# Patient Record
Sex: MALE | Race: WHITE | NOT HISPANIC OR LATINO | Employment: FULL TIME | URBAN - NONMETROPOLITAN AREA
[De-identification: names, ages, dates, MRNs, and addresses within clinical notes are randomized per-mention and may not be internally consistent; named-entity substitution may affect disease eponyms.]

---

## 2017-05-17 ENCOUNTER — HOSPITAL ENCOUNTER (EMERGENCY)
Facility: HOSPITAL | Age: 22
Discharge: HOME | End: 2017-05-17
Attending: EMERGENCY MEDICINE | Admitting: EMERGENCY MEDICINE

## 2017-05-17 VITALS
OXYGEN SATURATION: 98 % | SYSTOLIC BLOOD PRESSURE: 105 MMHG | DIASTOLIC BLOOD PRESSURE: 66 MMHG | WEIGHT: 123 LBS | HEART RATE: 74 BPM | RESPIRATION RATE: 18 BRPM | TEMPERATURE: 98.4 F | BODY MASS INDEX: 15.78 KG/M2 | HEIGHT: 74 IN

## 2017-05-17 DIAGNOSIS — F32.A DEPRESSION, UNSPECIFIED DEPRESSION TYPE: Primary | ICD-10-CM

## 2017-05-17 LAB
ALBUMIN SERPL-MCNC: 4.9 G/DL (ref 3.4–4.8)
ALBUMIN/GLOB SERPL: 1.6 G/DL (ref 1.1–1.8)
ALP SERPL-CCNC: 60 U/L (ref 38–126)
ALT SERPL W P-5'-P-CCNC: 30 U/L (ref 21–72)
AMPHET+METHAMPHET UR QL: NEGATIVE
ANION GAP SERPL CALCULATED.3IONS-SCNC: 12 MMOL/L (ref 5–15)
APAP SERPL-MCNC: <10 MCG/ML (ref 10–30)
AST SERPL-CCNC: 26 U/L (ref 17–59)
BARBITURATES UR QL SCN: NEGATIVE
BASOPHILS # BLD AUTO: 0.01 10*3/MM3 (ref 0–0.2)
BASOPHILS NFR BLD AUTO: 0.1 % (ref 0–2)
BENZODIAZ UR QL SCN: NEGATIVE
BILIRUB SERPL-MCNC: 0.7 MG/DL (ref 0.2–1.3)
BUN BLD-MCNC: 13 MG/DL (ref 7–21)
BUN/CREAT SERPL: 13 (ref 7–25)
CALCIUM SPEC-SCNC: 10.4 MG/DL (ref 8.4–10.2)
CANNABINOIDS SERPL QL: POSITIVE
CHLORIDE SERPL-SCNC: 103 MMOL/L (ref 95–110)
CO2 SERPL-SCNC: 27 MMOL/L (ref 22–31)
COCAINE UR QL: NEGATIVE
CREAT BLD-MCNC: 1 MG/DL (ref 0.7–1.3)
DEPRECATED RDW RBC AUTO: 40.3 FL (ref 35.1–43.9)
EOSINOPHIL # BLD AUTO: 0.1 10*3/MM3 (ref 0–0.7)
EOSINOPHIL NFR BLD AUTO: 1.1 % (ref 0–7)
ERYTHROCYTE [DISTWIDTH] IN BLOOD BY AUTOMATED COUNT: 12.1 % (ref 11.5–14.5)
ETHANOL BLD-MCNC: <10 MG/DL (ref 0–10)
ETHANOL UR QL: <0.01 %
GFR SERPL CREATININE-BSD FRML MDRD: 93 ML/MIN/1.73 (ref 77–179)
GLOBULIN UR ELPH-MCNC: 3.1 GM/DL (ref 2.3–3.5)
GLUCOSE BLD-MCNC: 77 MG/DL (ref 60–100)
GLUCOSE BLDC GLUCOMTR-MCNC: 86 MG/DL (ref 70–130)
HCT VFR BLD AUTO: 42.7 % (ref 39–49)
HGB BLD-MCNC: 15.4 G/DL (ref 13.7–17.3)
HOLD SPECIMEN: NORMAL
IMM GRANULOCYTES # BLD: 0.01 10*3/MM3 (ref 0–0.02)
IMM GRANULOCYTES NFR BLD: 0.1 % (ref 0–0.5)
LYMPHOCYTES # BLD AUTO: 2.54 10*3/MM3 (ref 0.6–4.2)
LYMPHOCYTES NFR BLD AUTO: 28 % (ref 10–50)
MCH RBC QN AUTO: 32.6 PG (ref 26.5–34)
MCHC RBC AUTO-ENTMCNC: 36.1 G/DL (ref 31.5–36.3)
MCV RBC AUTO: 90.5 FL (ref 80–98)
METHADONE UR QL SCN: NEGATIVE
MONOCYTES # BLD AUTO: 0.76 10*3/MM3 (ref 0–0.9)
MONOCYTES NFR BLD AUTO: 8.4 % (ref 0–12)
NEUTROPHILS # BLD AUTO: 5.66 10*3/MM3 (ref 2–8.6)
NEUTROPHILS NFR BLD AUTO: 62.3 % (ref 37–80)
OPIATES UR QL: NEGATIVE
OXYCODONE UR QL SCN: NEGATIVE
PLATELET # BLD AUTO: 138 10*3/MM3 (ref 150–450)
PMV BLD AUTO: 10.8 FL (ref 8–12)
POTASSIUM BLD-SCNC: 3.7 MMOL/L (ref 3.5–5.1)
PROT SERPL-MCNC: 8 G/DL (ref 6.3–8.6)
RBC # BLD AUTO: 4.72 10*6/MM3 (ref 4.37–5.74)
SALICYLATES SERPL-MCNC: <1 MG/DL (ref 10–20)
SODIUM BLD-SCNC: 142 MMOL/L (ref 137–145)
WBC NRBC COR # BLD: 9.08 10*3/MM3 (ref 3.2–9.8)
WHOLE BLOOD HOLD SPECIMEN: NORMAL
WHOLE BLOOD HOLD SPECIMEN: NORMAL

## 2017-05-17 PROCEDURE — 80307 DRUG TEST PRSMV CHEM ANLYZR: CPT | Performed by: EMERGENCY MEDICINE

## 2017-05-17 PROCEDURE — 85025 COMPLETE CBC W/AUTO DIFF WBC: CPT | Performed by: EMERGENCY MEDICINE

## 2017-05-17 PROCEDURE — 82962 GLUCOSE BLOOD TEST: CPT

## 2017-05-17 PROCEDURE — 80053 COMPREHEN METABOLIC PANEL: CPT | Performed by: EMERGENCY MEDICINE

## 2017-05-17 PROCEDURE — 99284 EMERGENCY DEPT VISIT MOD MDM: CPT

## 2017-05-17 RX ORDER — SODIUM CHLORIDE 0.9 % (FLUSH) 0.9 %
10 SYRINGE (ML) INJECTION AS NEEDED
Status: DISCONTINUED | OUTPATIENT
Start: 2017-05-17 | End: 2017-05-17 | Stop reason: HOSPADM

## 2017-05-17 RX ADMIN — Medication 10 ML: at 11:08

## 2020-12-03 PROCEDURE — U0003 INFECTIOUS AGENT DETECTION BY NUCLEIC ACID (DNA OR RNA); SEVERE ACUTE RESPIRATORY SYNDROME CORONAVIRUS 2 (SARS-COV-2) (CORONAVIRUS DISEASE [COVID-19]), AMPLIFIED PROBE TECHNIQUE, MAKING USE OF HIGH THROUGHPUT TECHNOLOGIES AS DESCRIBED BY CMS-2020-01-R: HCPCS | Performed by: NURSE PRACTITIONER

## 2021-06-10 PROBLEM — R31.9 HEMATURIA: Status: ACTIVE | Noted: 2021-06-10

## 2021-06-10 PROBLEM — R10.9 ABDOMINAL PAIN: Status: ACTIVE | Noted: 2021-06-10

## 2021-06-14 ENCOUNTER — HOSPITAL ENCOUNTER (EMERGENCY)
Facility: HOSPITAL | Age: 26
Discharge: HOME | End: 2021-06-14
Attending: FAMILY MEDICINE | Admitting: FAMILY MEDICINE

## 2021-06-14 ENCOUNTER — APPOINTMENT (OUTPATIENT)
Dept: CT IMAGING | Facility: HOSPITAL | Age: 26
End: 2021-06-14

## 2021-06-14 VITALS
HEIGHT: 74 IN | WEIGHT: 126 LBS | TEMPERATURE: 97.3 F | BODY MASS INDEX: 16.17 KG/M2 | SYSTOLIC BLOOD PRESSURE: 123 MMHG | DIASTOLIC BLOOD PRESSURE: 66 MMHG | RESPIRATION RATE: 16 BRPM | HEART RATE: 60 BPM | OXYGEN SATURATION: 97 %

## 2021-06-14 DIAGNOSIS — N23 RENAL COLIC ON LEFT SIDE: ICD-10-CM

## 2021-06-14 DIAGNOSIS — N20.0 KIDNEY STONES: Primary | ICD-10-CM

## 2021-06-14 LAB
ALBUMIN SERPL-MCNC: 4.3 G/DL (ref 3.5–5.2)
ALBUMIN/GLOB SERPL: 2.2 G/DL
ALP SERPL-CCNC: 67 U/L (ref 39–117)
ALT SERPL W P-5'-P-CCNC: 7 U/L (ref 1–41)
ANION GAP SERPL CALCULATED.3IONS-SCNC: 5 MMOL/L (ref 5–15)
ANISOCYTOSIS BLD QL: ABNORMAL
AST SERPL-CCNC: 14 U/L (ref 1–40)
BACTERIA UR QL AUTO: ABNORMAL /HPF
BILIRUB SERPL-MCNC: 0.2 MG/DL (ref 0–1.2)
BILIRUB UR QL STRIP: NEGATIVE
BUN SERPL-MCNC: 11 MG/DL (ref 6–20)
BUN/CREAT SERPL: 10 (ref 7–25)
CALCIUM SPEC-SCNC: 9.8 MG/DL (ref 8.6–10.5)
CHLORIDE SERPL-SCNC: 102 MMOL/L (ref 98–107)
CLARITY UR: CLEAR
CO2 SERPL-SCNC: 31 MMOL/L (ref 22–29)
COLOR UR: YELLOW
CREAT SERPL-MCNC: 1.1 MG/DL (ref 0.76–1.27)
DEPRECATED RDW RBC AUTO: 41 FL (ref 37–54)
EOSINOPHIL # BLD MANUAL: 0.36 10*3/MM3 (ref 0–0.4)
EOSINOPHIL NFR BLD MANUAL: 5 % (ref 0.3–6.2)
ERYTHROCYTE [DISTWIDTH] IN BLOOD BY AUTOMATED COUNT: 11.9 % (ref 12.3–15.4)
GFR SERPL CREATININE-BSD FRML MDRD: 81 ML/MIN/1.73
GLOBULIN UR ELPH-MCNC: 2 GM/DL
GLUCOSE SERPL-MCNC: 93 MG/DL (ref 65–99)
GLUCOSE UR STRIP-MCNC: NEGATIVE MG/DL
HCT VFR BLD AUTO: 40.1 % (ref 37.5–51)
HGB BLD-MCNC: 13.9 G/DL (ref 13–17.7)
HGB UR QL STRIP.AUTO: ABNORMAL
HOLD SPECIMEN: NORMAL
HOLD SPECIMEN: NORMAL
HYALINE CASTS UR QL AUTO: ABNORMAL /LPF
KETONES UR QL STRIP: NEGATIVE
LEUKOCYTE ESTERASE UR QL STRIP.AUTO: ABNORMAL
LIPASE SERPL-CCNC: 26 U/L (ref 13–60)
LYMPHOCYTES # BLD MANUAL: 3.92 10*3/MM3 (ref 0.7–3.1)
LYMPHOCYTES NFR BLD MANUAL: 55 % (ref 19.6–45.3)
LYMPHOCYTES NFR BLD MANUAL: 8 % (ref 5–12)
MCH RBC QN AUTO: 32.4 PG (ref 26.6–33)
MCHC RBC AUTO-ENTMCNC: 34.7 G/DL (ref 31.5–35.7)
MCV RBC AUTO: 93.5 FL (ref 79–97)
MONOCYTES # BLD AUTO: 0.57 10*3/MM3 (ref 0.1–0.9)
NEUTROPHILS # BLD AUTO: 2.28 10*3/MM3 (ref 1.7–7)
NEUTROPHILS NFR BLD MANUAL: 30 % (ref 42.7–76)
NEUTS BAND NFR BLD MANUAL: 2 % (ref 0–5)
NITRITE UR QL STRIP: NEGATIVE
PH UR STRIP.AUTO: 6.5 [PH] (ref 5–9)
PLATELET # BLD AUTO: 144 10*3/MM3 (ref 140–450)
PMV BLD AUTO: 10.5 FL (ref 6–12)
POTASSIUM SERPL-SCNC: 3.8 MMOL/L (ref 3.5–5.2)
PROT SERPL-MCNC: 6.3 G/DL (ref 6–8.5)
PROT UR QL STRIP: NEGATIVE
RBC # BLD AUTO: 4.29 10*6/MM3 (ref 4.14–5.8)
RBC # UR: ABNORMAL /HPF
REF LAB TEST METHOD: ABNORMAL
SMALL PLATELETS BLD QL SMEAR: ADEQUATE
SODIUM SERPL-SCNC: 138 MMOL/L (ref 136–145)
SP GR UR STRIP: 1.02 (ref 1–1.03)
SQUAMOUS #/AREA URNS HPF: ABNORMAL /HPF
UROBILINOGEN UR QL STRIP: ABNORMAL
WBC # BLD AUTO: 7.12 10*3/MM3 (ref 3.4–10.8)
WBC MORPH BLD: NORMAL
WBC UR QL AUTO: ABNORMAL /HPF
WHOLE BLOOD HOLD SPECIMEN: NORMAL

## 2021-06-14 PROCEDURE — 81001 URINALYSIS AUTO W/SCOPE: CPT | Performed by: FAMILY MEDICINE

## 2021-06-14 PROCEDURE — 83690 ASSAY OF LIPASE: CPT | Performed by: FAMILY MEDICINE

## 2021-06-14 PROCEDURE — 85007 BL SMEAR W/DIFF WBC COUNT: CPT | Performed by: FAMILY MEDICINE

## 2021-06-14 PROCEDURE — 99283 EMERGENCY DEPT VISIT LOW MDM: CPT

## 2021-06-14 PROCEDURE — 85025 COMPLETE CBC W/AUTO DIFF WBC: CPT | Performed by: FAMILY MEDICINE

## 2021-06-14 PROCEDURE — 74176 CT ABD & PELVIS W/O CONTRAST: CPT

## 2021-06-14 PROCEDURE — 80053 COMPREHEN METABOLIC PANEL: CPT | Performed by: FAMILY MEDICINE

## 2021-06-14 RX ORDER — HYDROCODONE BITARTRATE AND ACETAMINOPHEN 5; 325 MG/1; MG/1
1 TABLET ORAL EVERY 6 HOURS PRN
Qty: 12 TABLET | Refills: 0 | Status: SHIPPED | OUTPATIENT
Start: 2021-06-14 | End: 2022-01-10

## 2021-06-14 RX ORDER — TAMSULOSIN HYDROCHLORIDE 0.4 MG/1
1 CAPSULE ORAL DAILY
Qty: 7 CAPSULE | Refills: 0 | Status: SHIPPED | OUTPATIENT
Start: 2021-06-14 | End: 2022-01-10

## 2021-06-14 RX ORDER — ONDANSETRON 4 MG/1
4 TABLET, ORALLY DISINTEGRATING ORAL EVERY 6 HOURS PRN
Qty: 10 TABLET | Refills: 0 | Status: SHIPPED | OUTPATIENT
Start: 2021-06-14 | End: 2022-01-10

## 2021-06-14 RX ORDER — SODIUM CHLORIDE 0.9 % (FLUSH) 0.9 %
10 SYRINGE (ML) INJECTION AS NEEDED
Status: DISCONTINUED | OUTPATIENT
Start: 2021-06-14 | End: 2021-06-14 | Stop reason: HOSPADM

## 2021-06-14 NOTE — ED TRIAGE NOTES
PT COMES IN TODAY C/O A KIDNEY STONE AND L FLANK PAIN FOR ABOUT 1.5 WEEKS. HE WAS SEEN AT .     HE IS ALERT AND ORIENTED X4. STATES THAT HE IS STILL URINATING.

## 2021-06-21 NOTE — ED PROVIDER NOTES
Subjective     Flank Pain  Pain location:  L flank  Pain quality: aching    Pain radiates to:  LLQ  Pain severity:  Moderate  Duration:  10 days  Progression:  Waxing and waning  Relieved by:  Nothing  Worsened by:  Nothing  Associated symptoms: no chest pain, no chills, no cough, no diarrhea, no dysuria, no fatigue, no fever, no nausea, no shortness of breath, no sore throat and no vomiting        Review of Systems   Constitutional: Negative for appetite change, chills, diaphoresis, fatigue and fever.   HENT: Negative for congestion, ear discharge, ear pain, nosebleeds, rhinorrhea, sinus pressure, sore throat and trouble swallowing.    Eyes: Negative for discharge and redness.   Respiratory: Negative for apnea, cough, chest tightness, shortness of breath and wheezing.    Cardiovascular: Negative for chest pain.   Gastrointestinal: Positive for abdominal pain. Negative for diarrhea, nausea and vomiting.   Endocrine: Negative for polyuria.   Genitourinary: Positive for flank pain. Negative for dysuria, frequency and urgency.   Musculoskeletal: Negative for myalgias and neck pain.   Skin: Negative for color change and rash.   Allergic/Immunologic: Negative for immunocompromised state.   Neurological: Negative for dizziness, seizures, syncope, weakness, light-headedness and headaches.   Hematological: Negative for adenopathy. Does not bruise/bleed easily.   Psychiatric/Behavioral: Negative for behavioral problems and confusion.   All other systems reviewed and are negative.      History reviewed. No pertinent past medical history.    No Known Allergies    Past Surgical History:   Procedure Laterality Date   • COLONOSCOPY         History reviewed. No pertinent family history.    Social History     Socioeconomic History   • Marital status: Single     Spouse name: Not on file   • Number of children: Not on file   • Years of education: Not on file   • Highest education level: Not on file   Tobacco Use   • Smoking status:  Never Smoker   • Smokeless tobacco: Never Used   Substance and Sexual Activity   • Alcohol use: Yes   • Drug use: No   • Sexual activity: Defer           Objective   Physical Exam  Vitals and nursing note reviewed.   Constitutional:       Appearance: He is well-developed.   HENT:      Head: Normocephalic and atraumatic.      Nose: Nose normal.   Eyes:      General: No scleral icterus.        Right eye: No discharge.         Left eye: No discharge.      Conjunctiva/sclera: Conjunctivae normal.      Pupils: Pupils are equal, round, and reactive to light.   Neck:      Trachea: No tracheal deviation.   Cardiovascular:      Rate and Rhythm: Normal rate and regular rhythm.      Heart sounds: Normal heart sounds. No murmur heard.     Pulmonary:      Effort: Pulmonary effort is normal. No respiratory distress.      Breath sounds: Normal breath sounds. No stridor. No wheezing or rales.   Abdominal:      General: Bowel sounds are normal. There is no distension.      Palpations: Abdomen is soft. There is no mass.      Tenderness: There is abdominal tenderness in the left lower quadrant. There is left CVA tenderness. There is no guarding or rebound.   Musculoskeletal:      Cervical back: Normal range of motion and neck supple.   Skin:     General: Skin is warm and dry.      Findings: No erythema or rash.   Neurological:      Mental Status: He is alert and oriented to person, place, and time.      Coordination: Coordination normal.   Psychiatric:         Behavior: Behavior normal.         Thought Content: Thought content normal.         Procedures           ED Course             Labs Reviewed   COMPREHENSIVE METABOLIC PANEL - Abnormal; Notable for the following components:       Result Value    CO2 31.0 (*)     All other components within normal limits    Narrative:     GFR Normal >60  Chronic Kidney Disease <60  Kidney Failure <15     URINALYSIS W/ MICROSCOPIC IF INDICATED (NO CULTURE) - Abnormal; Notable for the following  components:    Blood, UA Moderate (2+) (*)     Leuk Esterase, UA Small (1+) (*)     All other components within normal limits   CBC WITH AUTO DIFFERENTIAL - Abnormal; Notable for the following components:    RDW 11.9 (*)     All other components within normal limits   MANUAL DIFFERENTIAL - Abnormal; Notable for the following components:    Neutrophil % 30.0 (*)     Lymphocyte % 55.0 (*)     Lymphocytes Absolute 3.92 (*)     All other components within normal limits   URINALYSIS, MICROSCOPIC ONLY - Abnormal; Notable for the following components:    RBC, UA 13-20 (*)     Bacteria, UA Trace (*)     All other components within normal limits   LIPASE - Normal   RAINBOW DRAW    Narrative:     The following orders were created for panel order [facility].  Procedure                               Abnormality         Status                     ---------                               -----------         ------                     Green Top (Gel)[[id number]]                                  Final result               Lavender Top[[id number]]                                     Final result               Gold Top - SST[[id number]]                                   Final result                 Please view results for these tests on the individual orders.   CBC AND DIFFERENTIAL    Narrative:     The following orders were created for panel order CBC & Differential.  Procedure                               Abnormality         Status                     ---------                               -----------         ------                     CBC Auto Differential[[id number]]        Abnormal            Final result                 Please view results for these tests on the individual orders.   GREEN TOP   LAVENDER TOP   GOLD TOP - SST       CT Abdomen Pelvis Without Contrast   Final Result   Bilateral scattered nephrolithiasis also visualized on prior   study, with largest focus on the left measuring 8.5 x 3.8 mm,   with overall increase  in size bilaterally since prior.    Largest focus on prior study was 2 mm on the left.   Appendix is normal.   No evidence of obstructive uropathy on either side.   No evidence of bowel obstruction or perienteric inflammation.      Electronically signed by:  Charles Marroquin MD  6/14/2021 8:28 PM CDT   Workstation: [id number]                                          [facility]    Final diagnoses:   Kidney stones   Renal colic on left side       ED Disposition  ED Disposition     ED Disposition Condition Comment    Discharge Stable           [place], Theodore DILLARD MD  [address]  [facility] [address]  [facility] [address]  [phone]    Schedule an appointment as soon as possible for a visit in 2 days           Medication List      New Prescriptions    HYDROcodone-acetaminophen 5-325 MG per tablet  Commonly known as: NORCO  Take 1 tablet by mouth Every 6 (Six) Hours As Needed for Moderate Pain .     ondansetron ODT 4 MG disintegrating tablet  Commonly known as: ZOFRAN-ODT  Place 1 tablet on the tongue Every 6 (Six) Hours As Needed for Nausea or Vomiting.        Changed    * tamsulosin 0.4 MG capsule 24 hr capsule  Commonly known as: FLOMAX  Take 1 capsule by mouth Daily.  What changed: Another medication with the same name was added. Make sure you understand how and when to take each.     * tamsulosin 0.4 MG capsule 24 hr capsule  Commonly known as: FLOMAX  Take 1 capsule by mouth Daily.  What changed: You were already taking a medication with the same name, and this prescription was added. Make sure you understand how and when to take each.         * This list has 2 medication(s) that are the same as other medications prescribed for you. Read the directions carefully, and ask your doctor or other care provider to review them with you.               Where to Get Your Medications      These medications were sent to "[id number] - [place], [place] - [address]  AT [facility] [address] - [phone] [facility] [phone] FX  [address]  [facility] [address]    Phone: [phone]   · HYDROcodone-acetaminophen 5-325 MG per tablet  · ondansetron ODT 4 MG disintegrating tablet  · tamsulosin 0.4 MG capsule 24 hr capsule          Abilio Estes MD  06/21/21 0104

## 2021-07-15 DIAGNOSIS — N20.0 KIDNEY STONES: Primary | ICD-10-CM

## 2021-07-22 ENCOUNTER — LAB (OUTPATIENT)
Dept: LAB | Facility: HOSPITAL | Age: 26
End: 2021-07-22

## 2021-07-22 DIAGNOSIS — N20.0 KIDNEY STONES: ICD-10-CM

## 2021-07-22 LAB
ALBUMIN SERPL-MCNC: 4.5 G/DL (ref 3.5–5.2)
ALBUMIN/GLOB SERPL: 2 G/DL
ALP SERPL-CCNC: 64 U/L (ref 39–117)
ALT SERPL W P-5'-P-CCNC: 10 U/L (ref 1–41)
ANION GAP SERPL CALCULATED.3IONS-SCNC: 9 MMOL/L (ref 5–15)
AST SERPL-CCNC: 17 U/L (ref 1–40)
BILIRUB SERPL-MCNC: 0.3 MG/DL (ref 0–1.2)
BUN SERPL-MCNC: 8 MG/DL (ref 6–20)
BUN/CREAT SERPL: 6.8 (ref 7–25)
CALCIUM SPEC-SCNC: 9.6 MG/DL (ref 8.6–10.5)
CALCIUM SPEC-SCNC: 9.7 MG/DL (ref 8.6–10.5)
CHLORIDE SERPL-SCNC: 105 MMOL/L (ref 98–107)
CO2 SERPL-SCNC: 28 MMOL/L (ref 22–29)
CREAT SERPL-MCNC: 1.17 MG/DL (ref 0.76–1.27)
GFR SERPL CREATININE-BSD FRML MDRD: 75 ML/MIN/1.73
GLOBULIN UR ELPH-MCNC: 2.2 GM/DL
GLUCOSE SERPL-MCNC: 99 MG/DL (ref 65–99)
POTASSIUM SERPL-SCNC: 4.2 MMOL/L (ref 3.5–5.2)
PROT SERPL-MCNC: 6.7 G/DL (ref 6–8.5)
PTH-INTACT SERPL-MCNC: 47.1 PG/ML (ref 15–65)
SODIUM SERPL-SCNC: 142 MMOL/L (ref 136–145)
URATE SERPL-MCNC: 5.9 MG/DL (ref 3.4–7)

## 2021-07-22 PROCEDURE — 36415 COLL VENOUS BLD VENIPUNCTURE: CPT

## 2021-07-22 PROCEDURE — 83970 ASSAY OF PARATHORMONE: CPT

## 2021-07-22 PROCEDURE — 84550 ASSAY OF BLOOD/URIC ACID: CPT

## 2021-07-22 PROCEDURE — 80053 COMPREHEN METABOLIC PANEL: CPT

## 2021-07-27 ENCOUNTER — LAB (OUTPATIENT)
Dept: LAB | Facility: HOSPITAL | Age: 26
End: 2021-07-27

## 2021-07-27 DIAGNOSIS — N20.0 KIDNEY STONES: ICD-10-CM

## 2021-07-27 PROCEDURE — 84133 ASSAY OF URINE POTASSIUM: CPT

## 2021-07-27 PROCEDURE — 82340 ASSAY OF CALCIUM IN URINE: CPT

## 2021-07-27 PROCEDURE — 83935 ASSAY OF URINE OSMOLALITY: CPT

## 2021-07-27 PROCEDURE — 83945 ASSAY OF OXALATE: CPT

## 2021-07-27 PROCEDURE — 82570 ASSAY OF URINE CREATININE: CPT

## 2021-07-27 PROCEDURE — 82131 AMINO ACIDS SINGLE QUANT: CPT

## 2021-07-27 PROCEDURE — 84300 ASSAY OF URINE SODIUM: CPT

## 2021-07-27 PROCEDURE — 82140 ASSAY OF AMMONIA: CPT

## 2021-07-27 PROCEDURE — 83735 ASSAY OF MAGNESIUM: CPT

## 2021-07-27 PROCEDURE — 84560 ASSAY OF URINE/URIC ACID: CPT

## 2021-07-27 PROCEDURE — 84105 ASSAY OF URINE PHOSPHORUS: CPT

## 2021-07-27 PROCEDURE — 82507 ASSAY OF CITRATE: CPT

## 2021-07-27 PROCEDURE — 82436 ASSAY OF URINE CHLORIDE: CPT

## 2021-07-27 PROCEDURE — 84392 ASSAY OF URINE SULFATE: CPT

## 2021-07-27 PROCEDURE — 81003 URINALYSIS AUTO W/O SCOPE: CPT

## 2021-08-11 LAB
AMMONIA 24H UR-MCNC: ABNORMAL UG/DL
AMMONIA 24H UR-SRATE: 20 MEQ/24 HR
CA H2 PHOS DIHYD CRY URNS QL MICRO: 1.57 RATIO (ref 0–3)
CALCIUM 24H UR-MCNC: 6.4 MG/DL
CALCIUM 24H UR-MRATE: 96 MG/24 HR (ref 100–300)
CHLORIDE 24H UR-SCNC: 50 MMOL/L
CHLORIDE 24H UR-SRATE: 75 MMOL/24 HR (ref 110–250)
CITRATE 24H UR-MCNC: 115 MG/L
CITRATE 24H UR-MRATE: 173 MG/24 HR (ref 320–1240)
COM CRY STONE QL IR: 2.69 RATIO (ref 0–6)
CREAT 24H UR-MCNC: 84.6 MG/DL
CREAT 24H UR-MRATE: 1269 MG/24 HR (ref 1000–2000)
CYSTINE 24H UR-MCNC: 5.03 MG/L
CYSTINE 24H UR-MRATE: 7.55 MG/24 HR (ref 10–100)
LABORATORY COMMENT REPORT: ABNORMAL
MAGNESIUM 24H UR-MRATE: 39 MG/24 HR (ref 12–293)
MAGNESIUM UR-MCNC: 2.6 MG/DL
NA URATE CRY STONE QL IR: 1.72 RATIO (ref 0–4)
OSMOLALITY UR: 289 MOSMOL/KG (ref 300–900)
OXALATE 24H UR-MRATE: 15 MG/24 HR (ref 7–44)
OXALATE UR-MCNC: 10 MG/L
PH 24H UR: 6.6 [PH]
PHOSPHATE 24H UR-MCNC: 35.1 MG/DL
PHOSPHATE 24H UR-MRATE: 526.5 MG/24 HR (ref 400–1300)
POTASSIUM 24H UR-SRATE: 23 MMOL/24 HR (ref 25–125)
POTASSIUM UR-SCNC: 15.3 MMOL/L
SODIUM 24H UR-SCNC: 69 MMOL/L
SODIUM 24H UR-SRATE: 104 MMOL/24 HR (ref 58–337)
SPECIMEN VOL 24H UR: 1500 ML/24 HR (ref 800–1800)
SPECIMEN VOL 24H UR: 1500 ML/24 HR (ref 800–1800)
SULFATE 24H UR-SCNC: 7 MEQ/L
SULFATE 24H UR-SRATE: 11 MEQ/24 HR (ref 0–30)
TRI-PHOS CRY STONE MICRO: 0.05 RATIO (ref 0–1)
URATE 24H UR-MCNC: 20.9 MG/DL
URATE 24H UR-MRATE: 314 MG/24 HR (ref 250–750)
URATE DIHYD CRY STONE QL IR: 0.25 RATIO (ref 0–1.2)

## 2021-11-28 ENCOUNTER — HOSPITAL ENCOUNTER (EMERGENCY)
Facility: HOSPITAL | Age: 26
Discharge: HOME | End: 2021-11-28
Admitting: FAMILY MEDICINE

## 2021-11-28 VITALS
HEART RATE: 79 BPM | TEMPERATURE: 98.9 F | HEIGHT: 74 IN | DIASTOLIC BLOOD PRESSURE: 78 MMHG | BODY MASS INDEX: 16.68 KG/M2 | SYSTOLIC BLOOD PRESSURE: 111 MMHG | RESPIRATION RATE: 16 BRPM | WEIGHT: 130 LBS | OXYGEN SATURATION: 99 %

## 2021-11-28 DIAGNOSIS — J10.1 INFLUENZA A: Primary | ICD-10-CM

## 2021-11-28 DIAGNOSIS — R11.2 NAUSEA AND VOMITING, INTRACTABILITY OF VOMITING NOT SPECIFIED, UNSPECIFIED VOMITING TYPE: ICD-10-CM

## 2021-11-28 LAB
FLUAV RNA RESP QL NAA+PROBE: DETECTED
FLUBV RNA RESP QL NAA+PROBE: NOT DETECTED
HOLD SPECIMEN: NORMAL
HOLD SPECIMEN: NORMAL
SARS-COV-2 RNA RESP QL NAA+PROBE: NOT DETECTED
WHOLE BLOOD HOLD SPECIMEN: NORMAL

## 2021-11-28 PROCEDURE — 25010000002 ONDANSETRON PER 1 MG: Performed by: STUDENT IN AN ORGANIZED HEALTH CARE EDUCATION/TRAINING PROGRAM

## 2021-11-28 PROCEDURE — 99283 EMERGENCY DEPT VISIT LOW MDM: CPT

## 2021-11-28 PROCEDURE — 25010000002 KETOROLAC TROMETHAMINE PER 15 MG: Performed by: STUDENT IN AN ORGANIZED HEALTH CARE EDUCATION/TRAINING PROGRAM

## 2021-11-28 PROCEDURE — 96375 TX/PRO/DX INJ NEW DRUG ADDON: CPT

## 2021-11-28 PROCEDURE — 87636 SARSCOV2 & INF A&B AMP PRB: CPT

## 2021-11-28 PROCEDURE — 96374 THER/PROPH/DIAG INJ IV PUSH: CPT

## 2021-11-28 RX ORDER — KETOROLAC TROMETHAMINE 30 MG/ML
30 INJECTION, SOLUTION INTRAMUSCULAR; INTRAVENOUS EVERY 6 HOURS PRN
Status: DISCONTINUED | OUTPATIENT
Start: 2021-11-28 | End: 2021-11-28 | Stop reason: HOSPADM

## 2021-11-28 RX ORDER — OSELTAMIVIR PHOSPHATE 75 MG/1
75 CAPSULE ORAL 2 TIMES DAILY
Qty: 10 CAPSULE | Refills: 0 | Status: SHIPPED | OUTPATIENT
Start: 2021-11-28 | End: 2021-12-03

## 2021-11-28 RX ORDER — IBUPROFEN 600 MG/1
600 TABLET ORAL EVERY 6 HOURS PRN
Qty: 30 TABLET | Refills: 0 | Status: SHIPPED | OUTPATIENT
Start: 2021-11-28 | End: 2022-01-10

## 2021-11-28 RX ORDER — ACETAMINOPHEN 325 MG/1
650 TABLET ORAL EVERY 6 HOURS PRN
Qty: 84 TABLET | Refills: 0 | Status: SHIPPED | OUTPATIENT
Start: 2021-11-28 | End: 2022-01-10

## 2021-11-28 RX ORDER — SODIUM CHLORIDE 0.9 % (FLUSH) 0.9 %
10 SYRINGE (ML) INJECTION AS NEEDED
Status: DISCONTINUED | OUTPATIENT
Start: 2021-11-28 | End: 2021-11-28 | Stop reason: HOSPADM

## 2021-11-28 RX ORDER — ONDANSETRON 2 MG/ML
4 INJECTION INTRAMUSCULAR; INTRAVENOUS ONCE
Status: COMPLETED | OUTPATIENT
Start: 2021-11-28 | End: 2021-11-28

## 2021-11-28 RX ORDER — ONDANSETRON 4 MG/1
4 TABLET, FILM COATED ORAL EVERY 8 HOURS PRN
Qty: 21 TABLET | Refills: 0 | Status: SHIPPED | OUTPATIENT
Start: 2021-11-28 | End: 2022-01-10

## 2021-11-28 RX ORDER — OSELTAMIVIR PHOSPHATE 75 MG/1
75 CAPSULE ORAL ONCE
Status: COMPLETED | OUTPATIENT
Start: 2021-11-28 | End: 2021-11-28

## 2021-11-28 RX ADMIN — KETOROLAC TROMETHAMINE 30 MG: 30 INJECTION, SOLUTION INTRAMUSCULAR at 17:06

## 2021-11-28 RX ADMIN — OSELTAMIVIR PHOSPHATE 75 MG: 75 CAPSULE ORAL at 18:08

## 2021-11-28 RX ADMIN — SODIUM CHLORIDE, POTASSIUM CHLORIDE, SODIUM LACTATE AND CALCIUM CHLORIDE 1000 ML: 600; 310; 30; 20 INJECTION, SOLUTION INTRAVENOUS at 17:05

## 2021-11-28 RX ADMIN — ONDANSETRON 4 MG: 2 INJECTION INTRAMUSCULAR; INTRAVENOUS at 17:06

## 2021-12-02 ENCOUNTER — TELEPHONE (OUTPATIENT)
Dept: FAMILY MEDICINE CLINIC | Facility: CLINIC | Age: 26
End: 2021-12-02

## 2021-12-02 RX ORDER — IBUPROFEN 600 MG/1
TABLET ORAL
Qty: 30 TABLET | Refills: 0 | OUTPATIENT
Start: 2021-12-02

## 2021-12-02 NOTE — TELEPHONE ENCOUNTER
ATTEMPTED TO CONTACT PT TO SCHEDULE APT. NO ANSWER. LEFT VOICEMAIL.  ----- Message from Palmer Porras PharmD sent at 12/2/2021 10:24 AM CST -----  Patient seen in ED by resident Dr. Morrison, instructed to call here for appointment over the holiday weekend.See if we can call patient and get them in, currently unassigned.  If not that is ok, will deny refills.Thank you very much!

## 2021-12-07 ENCOUNTER — OFFICE VISIT (OUTPATIENT)
Dept: FAMILY MEDICINE CLINIC | Facility: CLINIC | Age: 26
End: 2021-12-07

## 2021-12-07 VITALS
SYSTOLIC BLOOD PRESSURE: 118 MMHG | OXYGEN SATURATION: 97 % | HEIGHT: 74 IN | DIASTOLIC BLOOD PRESSURE: 78 MMHG | BODY MASS INDEX: 15.31 KG/M2 | TEMPERATURE: 95.9 F | HEART RATE: 95 BPM | WEIGHT: 119.3 LBS

## 2021-12-07 DIAGNOSIS — Z00.00 ENCOUNTER FOR MEDICAL EXAMINATION TO ESTABLISH CARE: Primary | ICD-10-CM

## 2021-12-07 PROCEDURE — 99212 OFFICE O/P EST SF 10 MIN: CPT | Performed by: STUDENT IN AN ORGANIZED HEALTH CARE EDUCATION/TRAINING PROGRAM

## 2021-12-07 NOTE — PROGRESS NOTES
Family Medicine Residency  Misty Victor MD    Subjective:     Hugh East is a 26 y.o. male who presents today to [facility] care and FMLA paper work. Patient was seen in the ED for flu like symptoms and was found to be positive for Influenza A. Patient was treated with Tamiflu and NSAIDs. Patient was off work for 5 days due to the illness and requested for FMLA paperwork to be filled by physician.      Patient has no chronic illness and reports being fairly healthy.     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 Vapes one stick about week or week and half. Smokes weed occasionally. No etoh.     Family history : DM    Surgical history : none. Right Lithotripsy about a month ago and had stent placed.     Denies any chest pain, shortness of breath, palpitations, dizziness, nausea, vomiting, headache, fevers/chills.      The following portions of the patient's history were reviewed and updated as appropriate: allergies, current medications, past family history, past medical history, past social history, past surgical history and problem list.    Past Medical Hx:  History reviewed. No pertinent past medical history.    Past Surgical Hx:  Past Surgical History:   Procedure Laterality Date   • COLONOSCOPY         Current Meds:    Current Outpatient Medications:   •  acetaminophen (Tylenol) 325 MG tablet, Take 2 tablets by mouth Every 6 (Six) Hours As Needed for Mild Pain , Moderate Pain , Headache or Fever., Disp: 84 tablet, Rfl: 0  •  clotrimazole (Lotrimin AF) 1 % cream, Apply  topically to the appropriate area as directed 2 (Two) Times a Day., Disp: 12 g, Rfl: 0  •  HYDROcodone-acetaminophen (NORCO) 5-325 MG per tablet, Take 1 tablet by mouth Every 6 (Six) Hours As Needed for Moderate Pain ., Disp: 12 tablet, Rfl: 0  •  ibuprofen (ADVIL,MOTRIN) 600 MG tablet, Take 1 tablet by mouth Every 6 (Six) Hours As Needed for Mild Pain  or Moderate Pain ., Disp: 30 tablet, Rfl: 0  •  ondansetron  "(Zofran) 4 MG tablet, Take 1 tablet by mouth Every 8 (Eight) Hours As Needed for Nausea or Vomiting., Disp: 21 tablet, Rfl: 0  •  ondansetron ODT (ZOFRAN-ODT) 4 MG disintegrating tablet, Place 1 tablet on the tongue Every 6 (Six) Hours As Needed for Nausea or Vomiting., Disp: 10 tablet, Rfl: 0  •  tamsulosin (FLOMAX) 0.4 MG capsule 24 hr capsule, Take 1 capsule by mouth Daily., Disp: 3 capsule, Rfl: 0  •  tamsulosin (FLOMAX) 0.4 MG capsule 24 hr capsule, Take 1 capsule by mouth Daily., Disp: 7 capsule, Rfl: 0    Allergies:  No Known Allergies    Family Hx:  History reviewed. No pertinent family history.     Social History:  Social History     Socioeconomic History   • Marital status: Single   Tobacco Use   • Smoking status: Never Smoker   • Smokeless tobacco: Never Used   Substance and Sexual Activity   • Alcohol use: Yes   • Drug use: No   • Sexual activity: Defer       Review of Systems  Review of Systems   Constitutional: Negative for activity change, appetite change, chills, diaphoresis, fatigue, fever and unexpected weight change.   HENT: Negative for congestion, trouble swallowing and voice change.    Respiratory: Negative for cough, chest tightness, shortness of breath and wheezing.    Cardiovascular: Negative for chest pain, palpitations and leg swelling.   Gastrointestinal: Negative for abdominal pain, diarrhea, nausea and vomiting.   Genitourinary: Negative for difficulty urinating, frequency and urgency.   Musculoskeletal: Negative for arthralgias and myalgias.   Skin: Negative for color change and rash.   Neurological: Negative for dizziness, weakness, light-headedness and headaches.   Psychiatric/Behavioral: Negative for confusion, decreased concentration, sleep disturbance and suicidal ideas. The patient is not nervous/anxious.        Objective:     /78   Pulse 95   Temp 95.9 °F (35.5 °C)   Ht 188 cm (74\")   Wt 54.1 kg (119 lb 4.8 oz)   SpO2 97%   BMI 15.32 kg/m²   Physical Exam  Vitals and " nursing note reviewed.   Constitutional:       General: He is not in acute distress.     Appearance: Normal appearance. He is normal weight. He is not ill-appearing, toxic-appearing or diaphoretic.   HENT:      Head: Normocephalic and atraumatic.   Eyes:      Extraocular Movements: Extraocular movements intact.      Conjunctiva/sclera: Conjunctivae normal.      Pupils: Pupils are equal, round, and reactive to light.   Cardiovascular:      Rate and Rhythm: Normal rate and regular rhythm.      Pulses: Normal pulses.      Heart sounds: Normal heart sounds.   Pulmonary:      Effort: Pulmonary effort is normal.      Breath sounds: Normal breath sounds.   Abdominal:      General: Bowel sounds are normal.      Palpations: Abdomen is soft.   Musculoskeletal:         General: Normal range of motion.      Right lower leg: No edema.      Left lower leg: No edema.   Skin:     General: Skin is warm and dry.      Capillary Refill: Capillary refill takes less than 2 seconds.   Neurological:      General: No focal deficit present.      Mental Status: He is alert and oriented to person, place, and time.   Psychiatric:         Mood and Affect: Mood normal.         Behavior: Behavior normal.         Thought Content: Thought content normal.         Judgment: Judgment normal.          Assessment/Plan:     Hugh East is a 26 y.o. male who presents today to establish care and FMLA paper work. FMLA paperwork was filled in the office today and given to the patient. Patient has no chronic medical issues therefore will be seen in 6 months for annual physical exam and seen on prn basis for any issues that might arise.     Diagnoses and all orders for this visit:    1. Encounter for medical examination to establish care (Primary)      · Rx changes: none  · Patient Education: none   · Compliance at present is estimated to be good.   · Efforts to improve compliance (if necessary) will be directed at none.    Depression screening: Up to  date; last screen 12/7/2021     Follow-up:     Return in about 6 months (around 6/7/2022) for Recheck, Annual.    Preventative:  Health Maintenance   Topic Date Due   • ANNUAL PHYSICAL  Never done   • COVID-19 Vaccine (1) Never done   • TDAP/TD VACCINES (2 - Tdap) 02/17/2015   • HEPATITIS C SCREENING  Never done   • INFLUENZA VACCINE  Never done   • Pneumococcal Vaccine 0-64  Aged Out       Recommended: none  Vaccine Counseling: N/A    Weight  -Class: Underweight:<18.5kg/m2  -Patient's Body mass index is 15.32 kg/m². indicating that he is underweight (BMI < 18.5). Recommendations include: high calorie diet .   eat more fruits and vegetables, decrease soda or juice intake, increase water intake, plan meals, eat breakfast and have 3 meals a day    Alcohol use:  reports current alcohol use.  Nicotine status  reports that he has never smoked. He has never used smokeless tobacco.    Goals    None         RISK SCORE: 3        Misty Victor MD PGY-2  [facility] Family Medicine Residency   This document has been electronically signed by Misty Victor MD on December 7, 2021 17:17 CST

## 2021-12-08 NOTE — PROGRESS NOTES
I have reviewed the patient.  I have reviewed the notes, assessments, and/or procedures performed by Dr Misty Victor, I concur with her  documentation and assessment and plan for Hugh East.          This document has been electronically signed by Mir Nicole MD on December 8, 2021 14:02 CST

## 2022-01-10 PROCEDURE — 87635 SARS-COV-2 COVID-19 AMP PRB: CPT | Performed by: NURSE PRACTITIONER

## 2023-06-05 ENCOUNTER — APPOINTMENT (OUTPATIENT)
Dept: GENERAL RADIOLOGY | Facility: HOSPITAL | Age: 28
End: 2023-06-05

## 2023-06-05 ENCOUNTER — HOSPITAL ENCOUNTER (EMERGENCY)
Facility: HOSPITAL | Age: 28
Discharge: HOME | End: 2023-06-05
Attending: FAMILY MEDICINE | Admitting: FAMILY MEDICINE

## 2023-06-05 VITALS
BODY MASS INDEX: 16.68 KG/M2 | WEIGHT: 130 LBS | DIASTOLIC BLOOD PRESSURE: 70 MMHG | RESPIRATION RATE: 19 BRPM | SYSTOLIC BLOOD PRESSURE: 116 MMHG | TEMPERATURE: 98.5 F | HEART RATE: 88 BPM | OXYGEN SATURATION: 100 % | HEIGHT: 74 IN

## 2023-06-05 DIAGNOSIS — S63.259A DISLOCATION OF FINGER, INITIAL ENCOUNTER: Primary | ICD-10-CM

## 2023-06-05 PROCEDURE — 73130 X-RAY EXAM OF HAND: CPT

## 2023-06-05 PROCEDURE — 99283 EMERGENCY DEPT VISIT LOW MDM: CPT

## 2023-06-05 RX ORDER — LIDOCAINE HYDROCHLORIDE 10 MG/ML
10 INJECTION, SOLUTION EPIDURAL; INFILTRATION; INTRACAUDAL; PERINEURAL ONCE
Status: COMPLETED | OUTPATIENT
Start: 2023-06-05 | End: 2023-06-05

## 2023-06-05 RX ORDER — OXYCODONE HYDROCHLORIDE AND ACETAMINOPHEN 5; 325 MG/1; MG/1
1 TABLET ORAL EVERY 6 HOURS PRN
Qty: 12 TABLET | Refills: 0 | Status: SHIPPED | OUTPATIENT
Start: 2023-06-05

## 2023-06-05 RX ORDER — HYDROCODONE BITARTRATE AND ACETAMINOPHEN 7.5; 325 MG/1; MG/1
1 TABLET ORAL ONCE
Status: COMPLETED | OUTPATIENT
Start: 2023-06-05 | End: 2023-06-05

## 2023-06-05 RX ADMIN — LIDOCAINE HYDROCHLORIDE 10 ML: 10 INJECTION, SOLUTION EPIDURAL; INFILTRATION; INTRACAUDAL; PERINEURAL at 22:21

## 2023-06-05 RX ADMIN — HYDROCODONE BITARTRATE AND ACETAMINOPHEN 1 TABLET: 7.5; 325 TABLET ORAL at 22:20

## 2023-06-06 NOTE — ED PROVIDER NOTES
Subjective   History of Present Illness   Patient presents emergency department with an injury to his right fifth finger that he sustained roughly 1 hour prior to arrival.  The finger is grossly deformed, consistent with dislocation or possible fracture.    Finger Injury  Location:  Right fifth finger  Quality:  Aching  Severity:  Moderate  Onset quality:  Sudden  Duration:  1 hour  Chronicity:  New  Relieved by:  Nothing  Worsened by:  Movement  Associated symptoms: no abdominal pain, no chest pain, no congestion, no cough, no diarrhea, no ear pain, no fatigue, no fever, no headaches, no myalgias, no nausea, no rash, no rhinorrhea, no shortness of breath, no sore throat, no vomiting and no wheezing      Review of Systems   Constitutional:  Negative for appetite change, chills, diaphoresis, fatigue and fever.   HENT:  Negative for congestion, ear discharge, ear pain, nosebleeds, rhinorrhea, sinus pressure, sore throat and trouble swallowing.    Eyes:  Negative for discharge and redness.   Respiratory:  Negative for apnea, cough, chest tightness, shortness of breath and wheezing.    Cardiovascular:  Negative for chest pain.   Gastrointestinal:  Negative for abdominal pain, diarrhea, nausea and vomiting.   Endocrine: Negative for polyuria.   Genitourinary:  Negative for dysuria, frequency and urgency.   Musculoskeletal:  Positive for arthralgias and joint swelling. Negative for myalgias and neck pain.   Skin:  Negative for color change and rash.   Allergic/Immunologic: Negative for immunocompromised state.   Neurological:  Negative for dizziness, seizures, syncope, weakness, light-headedness and headaches.   Hematological:  Negative for adenopathy. Does not bruise/bleed easily.   Psychiatric/Behavioral:  Negative for behavioral problems and confusion.    All other systems reviewed and are negative.    History reviewed. No pertinent past medical history.    No Known Allergies    Past Surgical History:   Procedure  Laterality Date    COLONOSCOPY         History reviewed. No pertinent family history.    Social History     Socioeconomic History    Marital status: Single   Tobacco Use    Smoking status: Never    Smokeless tobacco: Never   Substance and Sexual Activity    Alcohol use: Yes    Drug use: No    Sexual activity: Defer           Objective   Physical Exam  Vitals and nursing note reviewed.   Constitutional:       Appearance: He is well-developed.   HENT:      Head: Normocephalic and atraumatic.      Nose: Nose normal.   Eyes:      General: No scleral icterus.        Right eye: No discharge.         Left eye: No discharge.      Conjunctiva/sclera: Conjunctivae normal.      Pupils: Pupils are equal, round, and reactive to light.   Neck:      Trachea: No tracheal deviation.   Cardiovascular:      Rate and Rhythm: Normal rate and regular rhythm.      Heart sounds: Normal heart sounds. No murmur heard.  Pulmonary:      Effort: Pulmonary effort is normal. No respiratory distress.      Breath sounds: Normal breath sounds. No stridor. No wheezing or rales.   Abdominal:      General: Bowel sounds are normal. There is no distension.      Palpations: Abdomen is soft. There is no mass.      Tenderness: There is no abdominal tenderness. There is no guarding or rebound.   Musculoskeletal:      Left hand: Swelling, deformity and bony tenderness present. Decreased range of motion.        Hands:       Cervical back: Normal range of motion and neck supple.   Skin:     General: Skin is warm and dry.      Findings: No erythema or rash.   Neurological:      Mental Status: He is alert and oriented to person, place, and time.      Coordination: Coordination normal.   Psychiatric:         Behavior: Behavior normal.         Thought Content: Thought content normal.       Upper Extremity Dislocation    Date/Time: 6/5/2023 11:18 PM  Performed by: Abilio Estes MD  Authorized by: Abilio Estes MD   Injury location: finger  Location  details: right little finger  Injury type: dislocation  Dislocation type: MCP  Pre-procedure distal perfusion: normal  Pre-procedure neurological function: normal  Pre-procedure range of motion: reduced  Anesthesia: digital block    Anesthesia:  Local anesthesia used: yes  Local Anesthetic: lidocaine 1% without epinephrine    Sedation:  Patient sedated: no    Manipulation performed: yes  Reduction successful: yes  X-ray confirmed reduction: yes  Immobilization: splint  Splint type: static finger  Post-procedure neurovascular assessment: post-procedure neurovascularly intact  Post-procedure distal perfusion: normal  Post-procedure neurological function: normal  Post-procedure range of motion: normal  Patient tolerance: patient tolerated the procedure well with no immediate complications               ED Course             Labs Reviewed - No data to display    XR Hand 3+ View Right   Preliminary Result   FINDINGS/IMPRESSION:   Successful reduction.  Small ossific fragments anterior to the interphalangeal   joint of the fifth digit likely represent dislocation related avulsion   fractures.      XR Hand 3+ View Right   Preliminary Result   FINDINGS/IMPRESSION:   There is dorsomedial dislocation at the proximal interphalangeal joint of the   fifth digit.                                          Medical Decision Making  Problems Addressed:  Dislocation of finger, initial encounter: complicated acute illness or injury    Amount and/or Complexity of Data Reviewed  Radiology: ordered.    Risk  Prescription drug management.        Final diagnoses:   Dislocation of finger, initial encounter       ED Disposition  ED Disposition       ED Disposition   Discharge    Condition   Stable    Comment   --               Allie Valentin, APRN  [address] [facility] [address] [place] [id number] [phone]    In 1 week           Medication List        New Prescriptions      oxyCODONE-acetaminophen 5-325 MG per tablet  Commonly known as:  PERCOCET  Take 1 tablet by mouth Every 6 (Six) Hours As Needed for Moderate Pain.               Where to Get Your Medications        These medications were sent to [id number] - [facility] [address] [facility] [address] [facility] [address] - [phone]  - [phone]   [address] [facility] [address]      Phone: [phone]   oxyCODONE-acetaminophen 5-325 MG per tablet            Abilio Estes MD  06/05/23 4159       Abilio Estes MD  06/05/23 5025

## 2023-07-05 PROBLEM — M89.8X6 PAIN OF RIGHT FIBULA: Status: ACTIVE | Noted: 2023-07-05

## 2023-07-05 PROBLEM — S82.891A CLOSED FRACTURE OF RIGHT ANKLE: Status: ACTIVE | Noted: 2023-07-05

## 2023-07-05 PROBLEM — S82.61XA CLOSED DISPLACED FRACTURE OF LATERAL MALLEOLUS OF RIGHT FIBULA: Status: ACTIVE | Noted: 2023-07-05

## 2023-07-05 PROBLEM — M25.571 ACUTE RIGHT ANKLE PAIN: Status: ACTIVE | Noted: 2023-07-05

## 2023-07-28 ENCOUNTER — OFFICE VISIT (OUTPATIENT)
Dept: ORTHOPEDIC SURGERY | Facility: CLINIC | Age: 28
End: 2023-07-28

## 2023-07-28 VITALS — BODY MASS INDEX: 17.32 KG/M2 | HEIGHT: 74 IN | WEIGHT: 135 LBS

## 2023-07-28 DIAGNOSIS — S82.61XD CLOSED DISPLACED FRACTURE OF LATERAL MALLEOLUS OF RIGHT FIBULA WITH ROUTINE HEALING, SUBSEQUENT ENCOUNTER: ICD-10-CM

## 2023-07-28 DIAGNOSIS — Z98.890 STATUS POST ORIF OF FRACTURE OF ANKLE: Primary | ICD-10-CM

## 2023-07-28 DIAGNOSIS — M25.571 ACUTE RIGHT ANKLE PAIN: ICD-10-CM

## 2023-07-28 DIAGNOSIS — Z87.81 STATUS POST ORIF OF FRACTURE OF ANKLE: Primary | ICD-10-CM

## 2023-07-28 DIAGNOSIS — Z48.89 ENCOUNTER FOR POSTOPERATIVE CARE: ICD-10-CM

## 2023-07-28 PROCEDURE — 99024 POSTOP FOLLOW-UP VISIT: CPT | Performed by: SPECIALIST/TECHNOLOGIST, OTHER

## 2023-07-28 NOTE — PROGRESS NOTES
"Postop Follow-up    Name:  Hugh East  Date:  2023  :  1995    Chief Complaint:    Chief Complaint   Patient presents with    Right Ankle - Pain, Follow-up, Post-op     Date of surgery:    23 (3w 1d) Oseas Lara MD   Ankle Open Reduction Internal Fixation  For Lateral Malleolus Fracture - Right     Procedure:    History of Present Illness:  28-year-old patient who is 3 wks s/p open duction internal fixation for Right ankle flexion fixation with lateral mellitus fracture.  Doing well overall, no major concerns, happy with the progress here for routine postoperative check.    The following portions of the patient's history were reviewed and updated as appropriate: allergies, current medications, past family history, past medical history, past social history, past surgical history and problem list.       Current Outpatient Medications:     oxyCODONE-acetaminophen (PERCOCET) 7.5-325 MG per tablet, Take 1 tablet by mouth Every 4 (Four) Hours As Needed for Pain., Disp: 40 tablet, Rfl: 0    No Known Allergies      Exam:  Vitals:    23 0901   Weight: 61.2 kg (135 lb)   Height: 188 cm (74\")       The patient is awake, alert, and oriented and in no apparent distress.  Right lower extremity:  Surgical incision healed well with no wound concerns  Subsequent was wondering today.  Dressing reapplied along with a cam walker boot.  Patient is able to actively flex extend the toes and foot right side with no sign of infection.  No sign of DVT/compartment soft nontender.  Brisk capillary reflexes noted.        XR Ankle 3+ View Right    Result Date: 2023  Narrative: INDICATION: pain. COMPARISON: 2023. FINDINGS: Interval ORIF with plate/screw fixation construct distal ulna.  Alignment is anatomic.  No prior hardware complication.  Fracture line is still visualized. Ankle mortise is intact.        Assessment:  28-year-old patient who is 3 wks s/p open duction internal fixation for Right ankle " flexion fixation with lateral mellitus fracture.  Doing well overall, no major concerns, happy with the progress here for routine postoperative check.    Diagnoses and all orders for this visit:    Status post ORIF of fracture of ankle    Acute right ankle pain    Closed displaced fracture of lateral malleolus of right fibula with routine healing, subsequent encounter    Encounter for postoperative care          Plan:  Reassurance.  Patient is advised to continue rest, activity modification, continuing the cam boot to protect the right ankle fracture and nonweightbearing on the right lower extremity at present.  Patient is advised to continue resting the right foot, elevation, active foot and ankle and toe movements while at rest to minimize swelling and to promote ROM without having stiffness postoperatively.  Also isolation, local pain gel application and over-the-counter pain meds were suggested for additional pain relief.  Would like to review him and 1 months time for a follow-up visit with repeat x-rays and to discuss outpatient rehab at that point.  The patient voiced understanding of the risks, benefits, and alternative forms of treatment that were discussed and the patient agreed to the treatment plan.  This discussion was held with the patient by  Oseas Lara MD and all questions were answered.  EMR Dragon/Transciption Disclaimer: Some of this note may be an electronic transcription/translation of spoken language to printed text using the Dragon Dictation System.  Time spent of a minimum of 25 minutes including the face to face evaluation, reviewing of medical history and prior medial records, reviewing of diagnostic studies, documentation, patient education and coordination of care and surgical treatment decision.      Return in about 4 weeks (around 8/25/2023) for Recheck with repeat x-rays right ankle previous.      08/16/23 at 09:03 CDT by Oseas Lara MD

## 2023-08-16 PROBLEM — Z98.890 STATUS POST ORIF OF FRACTURE OF ANKLE: Status: ACTIVE | Noted: 2023-08-16

## 2023-08-16 PROBLEM — Z87.81 STATUS POST ORIF OF FRACTURE OF ANKLE: Status: ACTIVE | Noted: 2023-08-16

## 2023-08-28 ENCOUNTER — OFFICE VISIT (OUTPATIENT)
Dept: ORTHOPEDIC SURGERY | Facility: CLINIC | Age: 28
End: 2023-08-28
Payer: MEDICAID

## 2023-08-28 VITALS — HEIGHT: 74 IN | WEIGHT: 132.6 LBS | BODY MASS INDEX: 17.02 KG/M2

## 2023-08-28 DIAGNOSIS — Z98.890 STATUS POST ORIF OF FRACTURE OF ANKLE: Primary | ICD-10-CM

## 2023-08-28 DIAGNOSIS — Z48.89 ENCOUNTER FOR POSTOPERATIVE CARE: ICD-10-CM

## 2023-08-28 DIAGNOSIS — Z87.81 STATUS POST ORIF OF FRACTURE OF ANKLE: Primary | ICD-10-CM

## 2023-08-28 DIAGNOSIS — S82.61XD CLOSED DISPLACED FRACTURE OF LATERAL MALLEOLUS OF RIGHT FIBULA WITH ROUTINE HEALING, SUBSEQUENT ENCOUNTER: ICD-10-CM

## 2023-08-28 NOTE — LETTER
August 28, 2023     Patient: Hugh East   YOB: 1995   Date of Visit: 8/28/2023       To Whom It May Concern:    It is my medical opinion that Hugh East should remain out of work until next follow up 4-6 weeks  .           Sincerely,        Oseas Lara MD    CC:   No Recipients

## 2023-08-28 NOTE — PROGRESS NOTES
"Hugh East is a 28 y.o. male is s/p       Chief Complaint   Patient presents with    Right Ankle - Post-op       Date of surgery:    07/06/23  Oseas Lara MD   Ankle Open Reduction Internal Fixation  For Lateral Malleolus Fracture - Right     History of Present Illness:  28-year-old patient who is 6 wks s/p open duction internal fixation for Right ankle flexion fixation with lateral mellitus fracture.  Doing well overall, no major concerns, happy with the progress here for routine postoperative check.  Currently mobilizing nonweightbearing on the right lower extremity with a cam walker boot provided to him.  Doing well overall, denies any new concerns or new complaints.  Denies any new injuries.  Denies any tingling numbness in the right lower extremity that is operated.    The following portions of the patient's history were reviewed and updated as appropriate: allergies, current medications, past family history, past medical history, past social history, past surgical history and problem list.     No current outpatient medications on file.    No Known Allergies      Exam:  Vitals:    08/28/23 0851   Weight: 60.1 kg (132 lb 9.6 oz)   Height: 188 cm (74\")       The patient is awake, alert, and oriented and in no apparent distress.  Right lower extremity:  Surgical incision healed well with no wound concerns  Subsequent was wondering today.  Dressing reapplied along with a cam walker boot.  Patient is able to actively flex extend the toes and foot right side with no sign of infection.  No sign of DVT/compartment soft nontender.  Brisk capillary reflexes noted.        No results found.      Assessment:  28-year-old patient who is 6 wks s/p open duction internal fixation for Right ankle flexion fixation with lateral mellitus fracture.  Doing well overall, no major concerns, happy with the progress here for routine postoperative check.  Currently mobilizing nonweightbearing on the right lower extremity with a " cam walker boot provided to him.  Doing well overall, denies any new concerns or new complaints.  Denies any new injuries.  Denies any tingling numbness in the right lower extremity that is operated.    Diagnoses and all orders for this visit:    Status post ORIF of fracture of ankle  -     XR Ankle 3+ View Right  -     Ambulatory Referral to Physical Therapy Evaluate and treat; (as needed); ROM (weight bearing as tolerated), Strengthening, Stretching    Closed displaced fracture of lateral malleolus of right fibula with routine healing, subsequent encounter    Encounter for postoperative care      XR Ankle 3+ View Right  Narrative & Impression   INDICATION: 6 weeks status post open reduction total fixation of right lateral malleolus fracture.     COMPARISON:  7/5/2023, 7/13/2023.     FINDINGS:  Interval ORIF with plate/screw fixation construct distal fibula / lateral malleolus  Alignment is anatomic.  No prior hardware complication.  Fracture line not visualized with early callus formation as interval change. Ankle mortise is intact.  Ordering provider: Oseas Lara MD  Dictation: Oseas Lara MD; date: 08/28/2023, 12 PM CDT, workstation: [id number].       Plan:  Reassurance.  Patient is advised to continue rest, activity modification, continuing the cam boot to protect the right ankle fracture and partial weightbearing on the right lower extremity at present within the cam walker boot.  Encouraged to continue stretching exercises for his right ankle in the form of home exercises and also with outpatient physical therapy dedicated to improve functional outcome in the right ankle.  Referral was provided for outpatient physical therapy and rehabilitation of his right ankle at our [facility]    - ROM   - strengthening   - modialities( as needed)    - HEP    Patient is advised to continue resting the right foot, elevation, active foot and ankle and toe movements while at rest to minimize swelling and to promote ROM  without having stiffness postoperatively.  Also isolation, local pain gel application and over-the-counter pain meds were suggested for additional pain relief.  Would like to review him and 2 months time for a follow-up visit with repeat x-rays and to discuss outpatient rehab at that point.  The patient voiced understanding of the risks, benefits, and alternative forms of treatment that were discussed and the patient agreed to the treatment plan.  This discussion was held with the patient by  Oseas Lara MD and all questions were answered.  EMR Dragon/Transciption Disclaimer: Some of this note may be an electronic transcription/translation of spoken language to printed text using the Dragon Dictation System.  Time spent of a minimum of 25 minutes including the face to face evaluation, reviewing of medical history and prior medial records, reviewing of diagnostic studies, documentation, patient education and coordination of care and surgical treatment decision.      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    09/04/23 at 09:03 CDT by Oseas Lara MD

## 2023-08-31 ENCOUNTER — HOSPITAL ENCOUNTER (OUTPATIENT)
Dept: PHYSICAL THERAPY | Facility: HOSPITAL | Age: 28
Setting detail: THERAPIES SERIES
Discharge: HOME | End: 2023-08-31

## 2023-08-31 DIAGNOSIS — Z98.890 STATUS POST ORIF OF FRACTURE OF ANKLE: Primary | ICD-10-CM

## 2023-08-31 DIAGNOSIS — Z87.81 STATUS POST ORIF OF FRACTURE OF ANKLE: Primary | ICD-10-CM

## 2023-08-31 PROCEDURE — 97163 PT EVAL HIGH COMPLEX 45 MIN: CPT | Performed by: PHYSICAL THERAPIST

## 2023-08-31 NOTE — THERAPY EVALUATION
"  Outpatient Physical Therapy Ortho Initial Evaluation  [facility]     Patient Name: Hugh East  : 1995  MRN: [id number]  Today's Date: 2023      Visit Date: 2023    Attendance:  (pvt pay)  Subjective Improvement: n/a  Next MD Appt: 10/2/23  Recert Date: 23    Therapy Diagnosis: ORIF R distal fibula 23     Past Medical History:   Diagnosis Date    Kidney stones         Past Surgical History:   Procedure Laterality Date    ANKLE OPEN REDUCTION INTERNAL FIXATION Right 2023    Procedure: ANKLE OPEN REDUCTION INTERNAL FIXATION  FOR LATERAL MALLEOLUS FRACTURE;  Surgeon: Oseas Lara MD;  Location: [facility];  Service: Orthopedics;  Laterality: Right;    COLONOSCOPY      CYSTOSCOPY W/ LASER LITHOTRIPSY       No Known Allergies    Visit Dx:     ICD-10-CM ICD-9-CM   1. Status post ORIF of fracture of ankle  Z98.890 V45.89    Z87.81 V15.51          Patient History       Row Name 23 1300             History    Chief Complaint Difficulty with daily activities;Pain;Difficulty Walking  -      Type of Pain Foot pain;Ankle pain  right  -      Date Current Problem(s) Began 23  -      Brief Description of Current Complaint Patient was walking down the steps of his house and rolled his ankle. Went to MD next morning due to inability to weightbear. Fractured his right fibula. Underwent ORIF of the fibula at lateral malleolus on 23. Today is first day that he has been in a tennis shoe. Had been a boot up to today. Lives in a single story house with 3 steps to enter and none inside.  -      Patient/Caregiver Goals Comment \"Walk without hard limp.\" \"Be able to jog or run again.\"  -      Current Tobacco Use vapes  -      Patient's Rating of General Health Good  -      Occupation/sports/leisure activities [employer], off work since injury. Hobbies: play games, listen to music  -      What clinical tests have you had for this problem? " X-ray  -      Results of Clinical Tests healing fracture  -SS         Pain     Pain Location Foot;Ankle  -SS      Pain at Present 0  -SS      Pain at Best 0  -SS      Pain at Worst 6;5  over past 30 days  -SS      Pain Frequency Intermittent  -SS      Pain Description Tightness;Aching  -SS      What Performance Factors Make the Current Problem(s) WORSE? walking for prolonged periods  -SS      What Performance Factors Make the Current Problem(s) BETTER? get off feet, massage  -SS      Is your sleep disturbed? No  -SS      Is medication used to assist with sleep? No  -SS      Difficulties at work? off work  -SS      Difficulties with ADL's? decreased  -SS      Difficulties with recreational activities? none  -SS         Fall Risk Assessment    Any falls in the past year: Yes  -SS      Number of falls reported in the last 12 months 1  -SS      Factors that contributed to the fall: --  rushing down steps  -SS      Does patient have a fear of falling No  -SS         Daily Activities    Primary Language English  -         Safety    Are you being hurt, hit, or frightened by anyone at home or in your life? No  -SS      Have you had any of the following issues with Anxiety  -SS                User Key  (r) = Recorded By, (t) = Taken By, (c) = Cosigned By      Initials Name Provider Type     Nestor Morales, PT, DPT, CHT Physical Therapist                     PT Ortho       Row Name 08/31/23 1400       Subjective Comments    Subjective Comments see Therapy Patient History  -SS       Precautions and Contraindications    Precautions R fibula ORIF  -SS       Subjective Pain    Able to rate subjective pain? yes  -SS    Pre-Treatment Pain Level 0  -SS    Post-Treatment Pain Level 0  -SS       Posture/Observations    Posture/Observations Comments Presents this date wearing tennis shoes. Decreased R ankle ROM during gait.  -SS       General ROM    RT Lower Ext Rt Ankle Dorsiflexion;Rt Ankle Plantarflexion;Rt Ankle  Inversion;Rt Ankle Eversion  -SS       Right Lower Ext    Rt Ankle Dorsiflexion AROM 2 deg  -SS    Rt Ankle Plantarflexion AROM 70 deg  -SS    Rt Ankle Inversion AROM 20 deg  -SS    Rt Ankle Inversion PROM 44 deg  -SS    Rt Ankle Eversion AROM 10 deg  -SS    Rt Ankle Eversion PROM 10 deg  -SS       MMT (Manual Muscle Testing)    Rt Lower Ext Rt Hip Flexion;Rt Hip Extension;Rt Hip ABduction;Rt Hip ADduction;Rt Hip Internal (Medial) Rotation;Rt Hip External (Lateral) Rotation;Rt Knee Extension;Rt Knee Flexion;Rt Ankle Plantarflexion;Rt Ankle Dorsiflexion;Rt Ankle Subtalar Inversion;Rt Ankle Subtalar Eversion  -SS       MMT Right Lower Ext    Rt Hip Flexion MMT, Gross Movement (5/5) normal  -SS    Rt Hip Extension MMT, Gross Movement (5/5) normal  -SS    Rt Hip ABduction MMT, Gross Movement (5/5) normal  -SS    Rt Hip ADduction MMT, Gross Movement (5/5) normal  -SS    Rt Hip Internal (Medial) Rotation MMT, Gross Movement (5/5) normal  -SS    Rt Hip External (Lateral) Rotation MMT, Gross Movement (5/5) normal  -SS    Rt Knee Extension MMT, Gross Movement (5/5) normal  -SS    Rt Knee Flexion MMT, Gross Movement (5/5) normal  -SS    Rt Ankle Plantarflexion MMT, Gross Movement (5/5) normal  -SS    Rt Ankle Dorsiflexion MMT, Gross Movement (5/5) normal  -SS    Rt Ankle Subtalar Inversion MT, Gross Movement (5/5) normal  -SS    Rt Ankle Subtalar Eversion MMT, Gross Movement (5/5) normal  -SS    Rt Lower Extremity Comments  Flexion SLR 5/5  -SS              User Key  (r) = Recorded By, (t) = Taken By, (c) = Cosigned By      Initials Name Provider Type    SS Nestor Morales, PT, DPT, CHT Physical Therapist                                Therapy Education  Education Details: towel gastroc stretch, towel inversion stretch, ankle ABC's  Given: HEP  Program: New  How Provided: Verbal, Demonstration, Written  Provided to: Patient  Level of Understanding: Verbalized, Demonstrated      PT OP Goals       Row Name 08/31/23 1300           PT Short Term Goals    STG Date to Achieve --  STGs deferred  -SS        Long Term Goals    LTG Date to Achieve 09/28/23  -     LTG 1 Independent with HEP/self-management.  -     LTG 2 Ankle AROM WNLs.  -     LTG 3 Minimal gait deviation.  -     LTG 4 Complete ADLs and IADLs without increased pain.  -        Time Calculation    PT Goal Re-Cert Due Date 09/28/23  -               User Key  (r) = Recorded By, (t) = Taken By, (c) = Cosigned By      Initials Name Provider Type    Nestor Antoine, PT, DPT, CHT Physical Therapist                     PT Assessment/Plan       Row Name 08/31/23 1400          PT Assessment    Functional Limitations Impaired gait;Limitation in home management;Limitations in community activities;Limitations in functional capacity and performance;Performance in self-care ADL;Performance in leisure activities;Performance in work activities  -     Impairments Balance;Gait;Joint mobility;Muscle strength;Range of motion  -     Assessment Comments Patient is 8 weeks s/p ORIF R lateral malleolus fracture on 7/6/23. Limited ankle DF and IV. Strength and motion good otherwise. Gait deviation from ROM as much as anything.  -     Rehab Potential Excellent  -     Patient/caregiver participated in establishment of treatment plan and goals Yes  -     Patient would benefit from skilled therapy intervention Yes  -        PT Plan    PT Frequency 1x/week;2x/week  -     Predicted Duration of Therapy Intervention (PT) 3-4 weeks  -     Planned CPT's? PT EVAL HIGH COMPLEXITY: 68923;PT THER PROC EA 15 MIN: 44357;PT THER ACT EA 15 MIN: 54571;PT MANUAL THERAPY EA 15 MIN: 66775;PT HOT OR COLD PACK TREAT MCARE  -     PT Plan Comments ROM, stretching, gentle strengthening, balance training, Fluidotherapy, ice as needed for pain  -               User Key  (r) = Recorded By, (t) = Taken By, (c) = Cosigned By      Initials Name Provider Type    Nestor Antoine, PT,  DPT, CHT Physical Therapist                                                    Time Calculation:     Start Time: 1349  Stop Time: 1420  Time Calculation (min): 31 min     Therapy Charges for Today       Code Description Service Date Service Provider Modifiers Qty    [id number]  PT EVAL HIGH COMPLEXITY 3 8/31/2023 Nestor Morales, PT, DPT, CHT GP 1                      Nestor Morales, PT, DPT, CHT  8/31/2023

## 2023-09-04 PROBLEM — Z48.89 ENCOUNTER FOR POSTOPERATIVE CARE: Status: ACTIVE | Noted: 2023-09-04
